# Patient Record
Sex: MALE | Race: WHITE | NOT HISPANIC OR LATINO | URBAN - METROPOLITAN AREA
[De-identification: names, ages, dates, MRNs, and addresses within clinical notes are randomized per-mention and may not be internally consistent; named-entity substitution may affect disease eponyms.]

---

## 2017-08-24 ENCOUNTER — FOLLOW UP ESTABLISHED (OUTPATIENT)
Dept: URBAN - METROPOLITAN AREA CLINIC 64 | Facility: CLINIC | Age: 42
End: 2017-08-24
Payer: COMMERCIAL

## 2017-08-24 DIAGNOSIS — H52.13 MYOPIA, BILATERAL: Primary | ICD-10-CM

## 2017-08-24 PROCEDURE — 92015 DETERMINE REFRACTIVE STATE: CPT | Performed by: OPTOMETRIST

## 2017-08-24 PROCEDURE — 92014 COMPRE OPH EXAM EST PT 1/>: CPT | Performed by: OPTOMETRIST

## 2017-08-24 ASSESSMENT — INTRAOCULAR PRESSURE
OS: 13
OD: 14

## 2017-08-24 ASSESSMENT — VISUAL ACUITY
OS: 20/20
OD: 20/20

## 2017-08-24 ASSESSMENT — KERATOMETRY
OD: 44.20
OS: 44.56

## 2018-09-05 ENCOUNTER — FOLLOW UP ESTABLISHED (OUTPATIENT)
Dept: URBAN - METROPOLITAN AREA CLINIC 64 | Facility: CLINIC | Age: 43
End: 2018-09-05
Payer: COMMERCIAL

## 2018-09-05 PROCEDURE — 92014 COMPRE OPH EXAM EST PT 1/>: CPT | Performed by: OPTOMETRIST

## 2018-09-05 PROCEDURE — 92015 DETERMINE REFRACTIVE STATE: CPT | Performed by: OPTOMETRIST

## 2018-09-05 ASSESSMENT — VISUAL ACUITY
OS: 20/20
OD: 20/20

## 2018-09-05 ASSESSMENT — INTRAOCULAR PRESSURE
OS: 13
OD: 12

## 2018-09-05 ASSESSMENT — KERATOMETRY
OS: 44.41
OD: 44.17

## 2019-11-18 ENCOUNTER — FOLLOW UP ESTABLISHED (OUTPATIENT)
Dept: URBAN - METROPOLITAN AREA CLINIC 64 | Facility: CLINIC | Age: 44
End: 2019-11-18
Payer: COMMERCIAL

## 2019-11-18 PROCEDURE — 92310 CONTACT LENS FITTING OU: CPT | Performed by: OPTOMETRIST

## 2019-11-18 PROCEDURE — 92015 DETERMINE REFRACTIVE STATE: CPT | Performed by: OPTOMETRIST

## 2019-11-18 PROCEDURE — 92014 COMPRE OPH EXAM EST PT 1/>: CPT | Performed by: OPTOMETRIST

## 2019-11-18 ASSESSMENT — INTRAOCULAR PRESSURE
OS: 16
OD: 15

## 2019-11-18 ASSESSMENT — VISUAL ACUITY
OD: 20/20
OS: 20/20

## 2019-11-18 ASSESSMENT — KERATOMETRY
OD: 44.12
OS: 44.27

## 2020-12-01 ENCOUNTER — FOLLOW UP ESTABLISHED (OUTPATIENT)
Dept: URBAN - METROPOLITAN AREA CLINIC 64 | Facility: CLINIC | Age: 45
End: 2020-12-01
Payer: COMMERCIAL

## 2020-12-01 PROCEDURE — 92015 DETERMINE REFRACTIVE STATE: CPT | Performed by: OPTOMETRIST

## 2020-12-01 PROCEDURE — 92014 COMPRE OPH EXAM EST PT 1/>: CPT | Performed by: OPTOMETRIST

## 2020-12-01 PROCEDURE — 92310 CONTACT LENS FITTING OU: CPT | Performed by: OPTOMETRIST

## 2020-12-01 ASSESSMENT — INTRAOCULAR PRESSURE
OD: 14
OS: 13

## 2020-12-01 ASSESSMENT — KERATOMETRY
OD: 44.27
OS: 44.35

## 2020-12-01 ASSESSMENT — VISUAL ACUITY
OS: 20/20
OD: 20/20

## 2021-12-01 ENCOUNTER — OFFICE VISIT (OUTPATIENT)
Dept: URBAN - METROPOLITAN AREA CLINIC 64 | Facility: CLINIC | Age: 46
End: 2021-12-01
Payer: COMMERCIAL

## 2021-12-01 PROCEDURE — 92310 CONTACT LENS FITTING OU: CPT | Performed by: OPTOMETRIST

## 2021-12-01 PROCEDURE — 92014 COMPRE OPH EXAM EST PT 1/>: CPT | Performed by: OPTOMETRIST

## 2021-12-01 ASSESSMENT — VISUAL ACUITY
OD: 20/20
OS: 20/20

## 2021-12-01 ASSESSMENT — KERATOMETRY
OS: 44.47
OD: 44.12

## 2021-12-01 ASSESSMENT — INTRAOCULAR PRESSURE
OD: 16
OS: 17

## 2021-12-01 NOTE — IMPRESSION/PLAN
Impression: Myopia, bilateral Plan: Updated glasses and contact lens Rx. He tried DVO CLs but went back to mono-vision. Adjustment to CL near eye today. Trials before boxes. Prefers DVO glasses.

## 2023-04-25 ENCOUNTER — OFFICE VISIT (OUTPATIENT)
Dept: URBAN - METROPOLITAN AREA CLINIC 64 | Facility: CLINIC | Age: 48
End: 2023-04-25
Payer: COMMERCIAL

## 2023-04-25 DIAGNOSIS — H52.13 MYOPIA, BILATERAL: Primary | ICD-10-CM

## 2023-04-25 PROCEDURE — 92310 CONTACT LENS FITTING OU: CPT

## 2023-04-25 PROCEDURE — 92014 COMPRE OPH EXAM EST PT 1/>: CPT

## 2023-04-25 ASSESSMENT — VISUAL ACUITY
OS: 20/20
OD: 20/20

## 2023-04-25 ASSESSMENT — INTRAOCULAR PRESSURE
OS: 13
OD: 12

## 2023-04-25 ASSESSMENT — KERATOMETRY
OD: 44.12
OS: 44.44

## 2023-04-25 NOTE — IMPRESSION/PLAN
Impression: Myopia, bilateral: H52.13. Plan: Patient educated. Updated SRx released to patient. Monitor yearly Discussed monovision, okay to trial if patient elects. Updated CLRx and released to pt. Pt educated on proper CL care and hygiene including no sleeping, swimming, or showering in CL's. If any redness or discharge discontinue CL's and RTC. Monitor.

## 2024-07-30 ENCOUNTER — OFFICE VISIT (OUTPATIENT)
Dept: URBAN - METROPOLITAN AREA CLINIC 64 | Facility: LOCATION | Age: 49
End: 2024-07-30
Payer: COMMERCIAL

## 2024-07-30 DIAGNOSIS — H52.13 MYOPIA, BILATERAL: Primary | ICD-10-CM

## 2024-07-30 PROCEDURE — 92014 COMPRE OPH EXAM EST PT 1/>: CPT

## 2024-07-30 PROCEDURE — 92310 CONTACT LENS FITTING OU: CPT

## 2024-07-30 ASSESSMENT — VISUAL ACUITY
OS: 20/20
OD: 20/20

## 2024-07-30 ASSESSMENT — KERATOMETRY
OD: 43.95
OS: 44.48

## 2024-07-30 ASSESSMENT — INTRAOCULAR PRESSURE
OD: 15
OS: 18